# Patient Record
Sex: FEMALE | Race: BLACK OR AFRICAN AMERICAN | Employment: PART TIME | URBAN - METROPOLITAN AREA
[De-identification: names, ages, dates, MRNs, and addresses within clinical notes are randomized per-mention and may not be internally consistent; named-entity substitution may affect disease eponyms.]

---

## 2017-10-02 ENCOUNTER — HOSPITAL ENCOUNTER (OUTPATIENT)
Dept: LAB | Age: 57
Discharge: HOME | End: 2017-10-02

## 2017-10-02 ENCOUNTER — OFFICE VISIT (OUTPATIENT)
Dept: FAMILY MEDICINE CLINIC | Age: 57
End: 2017-10-02

## 2017-10-02 VITALS
TEMPERATURE: 98.3 F | BODY MASS INDEX: 32.17 KG/M2 | HEIGHT: 64 IN | WEIGHT: 188.4 LBS | SYSTOLIC BLOOD PRESSURE: 133 MMHG | HEART RATE: 82 BPM | DIASTOLIC BLOOD PRESSURE: 86 MMHG

## 2017-10-02 DIAGNOSIS — R73.03 PRE-DIABETES: ICD-10-CM

## 2017-10-02 DIAGNOSIS — I10 ESSENTIAL HYPERTENSION: Primary | ICD-10-CM

## 2017-10-02 DIAGNOSIS — H57.9 EYE PROBLEM: ICD-10-CM

## 2017-10-02 DIAGNOSIS — I10 HTN, GOAL BELOW 140/90: ICD-10-CM

## 2017-10-02 DIAGNOSIS — J30.1 CHRONIC SEASONAL ALLERGIC RHINITIS DUE TO POLLEN: ICD-10-CM

## 2017-10-02 DIAGNOSIS — E78.5 HYPERLIPIDEMIA, UNSPECIFIED HYPERLIPIDEMIA TYPE: ICD-10-CM

## 2017-10-02 LAB
ANION GAP SERPL CALC-SCNC: 7 MMOL/L (ref 5–15)
BASOPHILS # BLD: 0 K/UL (ref 0–0.1)
BASOPHILS NFR BLD: 1 % (ref 0–1)
BUN SERPL-MCNC: 14 MG/DL (ref 6–20)
BUN/CREAT SERPL: 13 (ref 12–20)
CALCIUM SERPL-MCNC: 9.3 MG/DL (ref 8.5–10.1)
CHLORIDE SERPL-SCNC: 106 MMOL/L (ref 97–108)
CHOLEST SERPL-MCNC: 160 MG/DL
CO2 SERPL-SCNC: 29 MMOL/L (ref 21–32)
CREAT SERPL-MCNC: 1.09 MG/DL (ref 0.55–1.02)
EOSINOPHIL # BLD: 0.2 K/UL (ref 0–0.4)
EOSINOPHIL NFR BLD: 4 % (ref 0–7)
ERYTHROCYTE [DISTWIDTH] IN BLOOD BY AUTOMATED COUNT: 12.6 % (ref 11.5–14.5)
EST. AVERAGE GLUCOSE BLD GHB EST-MCNC: 111 MG/DL
GLUCOSE SERPL-MCNC: 109 MG/DL (ref 65–100)
HBA1C MFR BLD: 5.5 % (ref 4.2–6.3)
HCT VFR BLD AUTO: 44 % (ref 35–47)
HDLC SERPL-MCNC: 51 MG/DL
HDLC SERPL: 3.1 {RATIO} (ref 0–5)
HGB BLD-MCNC: 14.1 G/DL (ref 11.5–16)
LDLC SERPL CALC-MCNC: 78.6 MG/DL (ref 0–100)
LIPID PROFILE,FLP: ABNORMAL
LYMPHOCYTES # BLD: 1.5 K/UL (ref 0.8–3.5)
LYMPHOCYTES NFR BLD: 22 % (ref 12–49)
MCH RBC QN AUTO: 27.6 PG (ref 26–34)
MCHC RBC AUTO-ENTMCNC: 32 G/DL (ref 30–36.5)
MCV RBC AUTO: 86.3 FL (ref 80–99)
MONOCYTES # BLD: 0.8 K/UL (ref 0–1)
MONOCYTES NFR BLD: 12 % (ref 5–13)
NEUTS SEG # BLD: 4.1 K/UL (ref 1.8–8)
NEUTS SEG NFR BLD: 61 % (ref 32–75)
PLATELET # BLD AUTO: 395 K/UL (ref 150–400)
POTASSIUM SERPL-SCNC: 4.4 MMOL/L (ref 3.5–5.1)
RBC # BLD AUTO: 5.1 M/UL (ref 3.8–5.2)
SODIUM SERPL-SCNC: 142 MMOL/L (ref 136–145)
TRIGL SERPL-MCNC: 152 MG/DL (ref ?–150)
VLDLC SERPL CALC-MCNC: 30.4 MG/DL
WBC # BLD AUTO: 6.6 K/UL (ref 3.6–11)

## 2017-10-02 PROCEDURE — 80048 BASIC METABOLIC PNL TOTAL CA: CPT | Performed by: FAMILY MEDICINE

## 2017-10-02 PROCEDURE — 85025 COMPLETE CBC W/AUTO DIFF WBC: CPT | Performed by: FAMILY MEDICINE

## 2017-10-02 PROCEDURE — 80061 LIPID PANEL: CPT | Performed by: FAMILY MEDICINE

## 2017-10-02 PROCEDURE — 83036 HEMOGLOBIN GLYCOSYLATED A1C: CPT | Performed by: FAMILY MEDICINE

## 2017-10-02 RX ORDER — HYDROCHLOROTHIAZIDE 25 MG/1
12.5 TABLET ORAL DAILY
Qty: 90 TAB | Refills: 1 | Status: SHIPPED | OUTPATIENT
Start: 2017-10-02

## 2017-10-02 RX ORDER — LOVASTATIN 40 MG/1
40 TABLET ORAL
Qty: 90 TAB | Refills: 3 | Status: SHIPPED | OUTPATIENT
Start: 2017-10-02

## 2017-10-02 NOTE — PROGRESS NOTES
Katheryn Ching is a 61 y.o. female    Issues discussed today include:    1) R ear problem:  Ringing, off and on. Has had issues with swimmers ear in that ear before. Denies pain now. No drainage.  + h/o environmental allergies, takes claritin or zyrtec prn and it helps the ringing. No nasal congestion. No fever. 2) HTN:  Chronic. Was being seen at [facility] on [address]. She reports taking HCTZ 12.5mg daily. Still taking, about to run out and would like refill. Checks BP at home, running 110-130/70s. Denies lightheadedness. No side effects. No CP or SOB. 3) HLD:  Chronic. Has been taking mevacor 40mg. Denies myalgias, weakness. + family h/o HLD. Nonsmoker. No h/o MI, CAD. Has been told she was borderline diabetic in the past.    4) Glaucoma risk: Was previously seen by ophthalmology, told she was at risk for glaucoma. Her mother has glaucoma. Was told to f/u in 1 yr, but she lost her insurance. Data reviewed or ordered today:       Other problems include:  Patient Active Problem List   Diagnosis Code    Hypertension I10    GERD (gastroesophageal reflux disease) K21.9    Allergic rhinitis, cause unspecified J30.9    Hypercholesterolemia E78.00       Medications:  Current Outpatient Prescriptions   Medication Sig Dispense Refill    hydroCHLOROthiazide (HYDRODIURIL) 25 mg tablet Take 0.5 Tabs by mouth daily. For blood pressure 90 Tab 1    lovastatin (MEVACOR) 40 mg tablet Take 1 Tab by mouth nightly. For cholesterol 90 Tab 3    ACETAMINOPHEN (TYLENOL 8 HOUR PO) Take  by mouth.  pantoprazole (PROTONIX) 40 mg tablet Take 1 tablet by mouth daily. For acid reflux 90 tablet 3    aluminum & magnesium hydroxide-simethicone (MYLANTA MAXIMUM STRENGTH) 400-400-40 mg/5 mL suspension Take 10 mL by mouth every six (6) hours as needed for Indigestion. 400 mL 3    FERROUS FUMARATE (IRON PO) Take  by mouth.  aspirin 81 mg tablet Take 81 mg by mouth.         multivitamin, stress formula (STRESS TAB) tablet Take 1 Tab by mouth daily.  cholecalciferol, vitamin d3, (VITAMIN D) 1,000 unit tablet Take 1,000 Units by mouth daily.  loratadine (CLARITIN) 10 mg tablet Take 10 mg by mouth daily as needed. Allergies: Allergies   Allergen Reactions    Latex Rash    Strawberry Hives and Rash    Bactrim [Sulfamethoprim Ds] Hives    Other Plant, Animal, Environmental Hives     Wool      Penicillin G Hives       LMP:  No LMP recorded. Patient has had a hysterectomy. Social History     Social History    Marital status: LEGALLY      Spouse name: N/A    Number of children: N/A    Years of education: N/A     Occupational History    Not on file.      Social History Main Topics    Smoking status: Never Smoker    Smokeless tobacco: Never Used    Alcohol use No    Drug use: No    Sexual activity: Yes     Partners: Male     Birth control/ protection: Surgical     Other Topics Concern    Not on file     Social History Narrative       Family History   Problem Relation Age of Onset    Hypertension Mother     Elevated Lipids Mother     Other Mother      arthritis    Glaucoma Mother     Hypertension Father     Elevated Lipids Father     Other Maternal Grandmother      arthritis    Other Daughter      dialysis-after pregnancy, was on it for 18 yrs then passed away       Review of Systems  Constitutional:  negative for fevers, chills  Eyes:    negative for eye redness  ENT:    negative for sore throat, nasal congestion  Respiratory:   negative for cough, dyspnea  Cardiovascular: negative for chest pain  Gastroinestinal: negative for bowel changes  Genitourinary:  negative for frequency  Musculoskeletal: negative for myalgias,weakness  Neurological:   negative for headaches, dizziness  Behavl/Psych:  negative for feelings of tobacco abuse or etoh overuse    Physical Exam  Visit Vitals    /86 (BP 1 Location: Left arm, BP Patient Position: Sitting)    Pulse 82    Temp 98.3 °F (36.8 °C) (Oral)    Ht 5' 3.78\" (1.62 m)    Wt 188 lb 6.4 oz (85.5 kg)    LMP Comment: Partial    BMI 32.56 kg/m2     BP Readings from Last 3 Encounters:   10/02/17 133/86   03/13/16 102/55   08/26/14 (!) 136/92     Constitutional: Appears well,  No acute distress, Vitals noted  Psychiatric:  Affect normal, Alert and Oriented to person/place/time  Eyes:  Conjunctiva clear, no drainage  ENT:  External ears and nose normal, Teeth and gums appear healthy, Mucous membranes moist. TMs grey and non-bulging bilaterally. OP without erythema, edema or exudate. Bilateral nares without active drainage, edema or polyps. Neck:  General inspection normal. Supple. Heart:  Normal HR, Normal S1 and S2,  Regular rhythm. No murmurs, rubs or gallops. No carotid bruit. Lungs:  Clear to auscultation, good respiratory effort, no wheezes, rales or rhonchi  Extremities:  Without edema, good peripheral pulses  Skin:  Warm to palpation, without rashes        Assessment/Plan:      ICD-10-CM ICD-9-CM    1. Essential hypertension I10 401.9 CBC WITH AUTOMATED DIFF      METABOLIC PANEL, BASIC      hydroCHLOROthiazide (HYDRODIURIL) 25 mg tablet   2. Hyperlipidemia, unspecified hyperlipidemia type E78.5 272.4 LIPID PANEL      lovastatin (MEVACOR) 40 mg tablet   3. Chronic seasonal allergic rhinitis due to pollen J30.1 477.0    4. Pre-diabetes R73.03 790.29 HEMOGLOBIN A1C WITH EAG   5. Eye problem H57.9 V41.1 REFERRAL TO OPTOMETRY      REFERRAL TO OPHTHALMOLOGY       New to [facility]   HTN, well controlled   HCTZ and mevacor refilled  Recommend daily 2nd gen antihistamine during season changes  Labs as above  See optometry, if concerns for glaucoma will pursue ophthalmology appt via access now    Follow-up Disposition:  Return in about 6 months (around 4/2/2018).         Justice Bahena MD  [address] [place] [address]

## 2017-10-02 NOTE — MR AVS SNAPSHOT
Visit Information Date & Time Provider Department Dept. Phone Encounter #  
 10/2/2017  2:45 PM Ernesto Youngblood MD [facility] [place] [phone] [id number] Upcoming Health Maintenance Date Due Hepatitis C Screening 1960 FOBT Q 1 YEAR AGE 50-75 2/17/2009 PAP AKA CERVICAL CYTOLOGY 8/1/2016 BREAST CANCER SCRN MAMMOGRAM 9/17/2016 INFLUENZA AGE 9 TO ADULT 10/15/2017* DTaP/Tdap/Td series (3 - Td) 8/4/2022 *Topic was postponed. The date shown is not the original due date. Allergies as of 10/2/2017  Review Complete On: 10/2/2017 By: Ernesto Youngblood MD  
  
 Severity Noted Reaction Type Reactions Latex Medium 08/23/2011   Side Effect Rash Strawberry Medium 08/23/2011   Side Effect Hives, Rash Bactrim [Sulfamethoprim Ds]  08/22/2012    Hives Other Plant, Animal, Environmental  12/30/2013    Hives [place] Penicillin G  07/23/2010    Hives Current Immunizations  Reviewed on 7/23/2010 Name Date TDAP Vaccine 8/4/2012  3:20 PM, 7/23/2010 Not reviewed this visit You Were Diagnosed With   
  
 Codes Comments HTN, goal below 140/90    -  Primary ICD-10-CM: I10 
ICD-9-CM: 401.9 Hyperlipidemia, unspecified hyperlipidemia type     ICD-10-CM: E78.5 ICD-9-CM: 272.4 Pre-diabetes     ICD-10-CM: R73.03 
ICD-9-CM: 790.29 Chronic seasonal allergic rhinitis due to pollen     ICD-10-CM: J30.1 ICD-9-CM: 477.0 Vitals BP Pulse Temp Height(growth percentile) Weight(growth percentile) 133/86 (BP 1 Location: Left arm, BP Patient Position: Sitting) 82 98.3 °F (36.8 °C) (Oral) 5' 3.78\" (1.62 m) 188 lb 6.4 oz (85.5 kg) BMI OB Status Smoking Status 32.56 kg/m2 Hysterectomy Never Smoker Vitals History BMI and BSA Data Body Mass Index Body Surface Area 32.56 kg/m 2 1.96 m 2 Preferred Pharmacy Pharmacy Name Phone [facility] [address], [address] [place] [address] [phone] Your Updated Medication List  
  
   
This list is accurate as of: 10/2/17  3:35 PM.  Always use your most recent med list.  
  
  
  
  
 aluminum & magnesium hydroxide-simethicone 400-400-40 mg/5 mL suspension Commonly known as:  MYLANTA MAXIMUM STRENGTH Take 10 mL by mouth every six (6) hours as needed for Indigestion. aspirin 81 mg tablet Take 81 mg by mouth. CLARITIN 10 mg tablet Generic drug:  loratadine Take 10 mg by mouth daily as needed. hydroCHLOROthiazide 25 mg tablet Commonly known as:  HYDRODIURIL Take 0.5 tablets by mouth daily. For blood pressure IRON PO Take  by mouth.  
  
 lovastatin 40 mg tablet Commonly known as:  MEVACOR Take 1 tablet by mouth nightly. For cholesterol  
  
 multivitamin, stress formula tablet Commonly known as:  STRESS TAB Take 1 Tab by mouth daily. pantoprazole 40 mg tablet Commonly known as:  PROTONIX Take 1 tablet by mouth daily. For acid reflux TYLENOL 8 HOUR PO Take  by mouth. VITAMIN D3 1,000 unit tablet Generic drug:  cholecalciferol Take 1,000 Units by mouth daily. Introducing [facility]! Dear Kamila Ott: Thank you for requesting a [employer] account. Our records indicate that you already have an active DropShip account. You can access your account anytime at [email or web address] [employer] Did you know that you can access your hospital and ER discharge instructions at any time in [employer]? You can also review all of your test results from your hospital stay or ER visit. Additional Information If you have questions, please visit the Frequently Asked Questions section of the [employer] website at [email or web address] Remember, [employer] is NOT to be used for urgent needs. For medical emergencies, dial 911. Now available from your iPhone and Android! Please provide this summary of care documentation to your next provider. If you have any questions after today's visit, please call [phone].

## 2017-10-02 NOTE — PATIENT INSTRUCTIONS
[email or web address] - you will need your prescription from the eye doctor and to measure your pupillary distance (distance between the middle of the darks of your eyes in centimeters) in order to order online           Managing Your Allergies: Care Instructions  Your Care Instructions  Managing your allergies is an important part of staying healthy. Your doctor will help you find out what may be causing the allergies. Common causes of allergy symptoms are house dust and dust mites, animal dander, mold, and pollen. As soon as you know what triggers your symptoms, try to reduce your exposure to your triggers. This can help prevent allergy symptoms, asthma, and other health problems. Ask your doctor about allergy medicine or immunotherapy. These treatments may help reduce or prevent allergy symptoms. Follow-up care is a key part of your treatment and safety. Be sure to make and go to all appointments, and call your doctor if you are having problems. It's also a good idea to know your test results and keep a list of the medicines you take. How can you care for yourself at home? · If you think that dust or dust mites are causing your allergies:  ¨ Wash sheets, pillowcases, and other bedding every week in hot water. ¨ Use airtight, dust-proof covers for pillows, duvets, and mattresses. Avoid plastic covers, because they tend to tear quickly and do not \"breathe. \" Wash according to the instructions. ¨ Remove extra blankets and pillows that you don't need. ¨ Use blankets that are machine-washable. ¨ Don't use home humidifiers. They can help mites live longer. · Use air-conditioning. Change or clean all filters every month. Keep windows closed. Use high-efficiency air filters. Don't use window or attic fans, which draw dust into the air. · If you're allergic to pet dander, keep pets outside or, at the very least, out of your bedroom. Old carpet and cloth-covered furniture can hold a lot of animal dander.  You may need to replace them. · Look for signs of cockroaches. Use cockroach baits to get rid of them. Then clean your home well. · If you're allergic to mold, don't keep indoor plants, because molds can grow in soil. Get rid of furniture, rugs, and drapes that smell musty. Check for mold in the bathroom. · If you're allergic to pollen, stay inside when pollen counts are high. · Don't smoke or let anyone else smoke in your house. Don't use fireplaces or wood-burning stoves. Avoid paint fumes, perfumes, and other strong odors. When should you call for help? Give an epinephrine shot if:  · You think you are having a severe allergic reaction. After giving an epinephrine shot call 911, even if you feel better. Call 911 if:  · You have symptoms of a severe allergic reaction. These may include:  ¨ Sudden raised, red areas (hives) all over your body. ¨ Swelling of the throat, mouth, lips, or tongue. ¨ Trouble breathing. ¨ Passing out (losing consciousness). Or you may feel very lightheaded or suddenly feel weak, confused, or restless. · You have been given an epinephrine shot, even if you feel better. Call your doctor now or seek immediate medical care if:  · You have symptoms of an allergic reaction, such as:  ¨ A rash or hives (raised, red areas on the skin). ¨ Itching. ¨ Swelling. ¨ Belly pain, nausea, or vomiting. Watch closely for changes in your health, and be sure to contact your doctor if:  · Your allergies get worse. · You need help controlling your allergies. · You have questions about allergy testing. · You do not get better as expected. Where can you learn more? Go to http://devora-nereida.info/. Enter L249 in the search box to learn more about \"Managing Your Allergies: Care Instructions. \"  Current as of: April 3, 2017  Content Version: 11.3  © 4860-1296 [employer].  Care instructions adapted under license by [employer] (which disclaims liability or warranty for this information). If you have questions about a medical condition or this instruction, always ask your healthcare professional. [place] [address] any warranty or liability for your use of this information.

## 2017-10-02 NOTE — PROGRESS NOTES
AVS given and reviewed. Prescription and pharmacy location discussed with patient. Instructed patient that Dr. Marichuy Meredith had order the HCTZ 25mg, but that she will have to cut tablet in half (1/2) equaling HCTZ 12.5mg. Patient verbalizes understanding and states that she used to do that in the past.  Instructed patient that she will need to meet with [facility]  to start access now process, before she can meet with an ophthalmologist. Nelia Jaimes not available at site, instructed patient that she should receive a call within 1 week and if not to please call our office to let us know. Vision resources sheet given to patient, medical record completed and will get to [facility] to be faxed requesting medical records from [address] phone # [phone], fax # [phone] Patient verbalized understanding and does not have any questions or concerns at this time.  Matt Pinto RN

## 2017-10-02 NOTE — PROGRESS NOTES
Coordination of Care  1. Have you been to the ER, urgent care clinic since your last visit? Hospitalized since your last visit? No    2. Have you seen or consulted any other health care providers outside of the [address] [place] [address] since your last visit? Include any pap smears or colon screening. No    Medications  Medication Reconciliation Performed: no  Patient does need refills     Learning Assessment Complete?  yes\

## 2017-10-04 ENCOUNTER — TELEPHONE (OUTPATIENT)
Dept: FAMILY MEDICINE CLINIC | Age: 57
End: 2017-10-04

## 2017-10-04 NOTE — TELEPHONE ENCOUNTER
Spoke to [employer] and scheduled AN screening appointment on 11/6/17 @ 9:00 am.    Hodan Hobbs

## 2017-11-01 ENCOUNTER — OFFICE VISIT (OUTPATIENT)
Dept: FAMILY MEDICINE CLINIC | Age: 57
End: 2017-11-01

## 2017-11-01 DIAGNOSIS — Z71.89 COUNSELING AND COORDINATION OF CARE: Primary | ICD-10-CM

## 2017-11-01 NOTE — PROGRESS NOTES
Met with Loree Oneal and explained the income verification that I need to do her [employer] application. She brought me a letter from her job but it was not on letterhead or notarized. The patient gave me permission to call her job to explain what kind of verification I need. She have an appointment with me on Monday 11/7/17 so I told her to keep that appointment and bring in the new letter from her job.   Xiao Cueto

## 2017-11-06 ENCOUNTER — OFFICE VISIT (OUTPATIENT)
Dept: FAMILY MEDICINE CLINIC | Age: 57
End: 2017-11-06

## 2017-11-06 DIAGNOSIS — Z71.89 COUNSELING AND COORDINATION OF CARE: Primary | ICD-10-CM

## 2017-12-15 ENCOUNTER — HOSPITAL ENCOUNTER (EMERGENCY)
Age: 57
Discharge: HOME | End: 2017-12-15
Attending: EMERGENCY MEDICINE
Payer: SELF-PAY

## 2017-12-15 VITALS
HEIGHT: 65 IN | WEIGHT: 184.3 LBS | OXYGEN SATURATION: 100 % | BODY MASS INDEX: 30.71 KG/M2 | TEMPERATURE: 97.7 F | DIASTOLIC BLOOD PRESSURE: 97 MMHG | HEART RATE: 63 BPM | SYSTOLIC BLOOD PRESSURE: 148 MMHG | RESPIRATION RATE: 16 BRPM

## 2017-12-15 DIAGNOSIS — N76.0 BV (BACTERIAL VAGINOSIS): ICD-10-CM

## 2017-12-15 DIAGNOSIS — R82.71 BACTERIA IN URINE: Primary | ICD-10-CM

## 2017-12-15 DIAGNOSIS — B96.89 BV (BACTERIAL VAGINOSIS): ICD-10-CM

## 2017-12-15 LAB
APPEARANCE UR: CLEAR
BACTERIA URNS QL MICRO: ABNORMAL /HPF
BILIRUB UR QL: NEGATIVE
CLUE CELLS VAG QL WET PREP: NORMAL
COLOR UR: ABNORMAL
EPITH CASTS URNS QL MICRO: ABNORMAL /LPF
GLUCOSE UR STRIP.AUTO-MCNC: NEGATIVE MG/DL
HGB UR QL STRIP: NEGATIVE
HYALINE CASTS URNS QL MICRO: ABNORMAL /LPF (ref 0–5)
KETONES UR QL STRIP.AUTO: NEGATIVE MG/DL
KOH PREP SPEC: NORMAL
LEUKOCYTE ESTERASE UR QL STRIP.AUTO: NEGATIVE
NITRITE UR QL STRIP.AUTO: NEGATIVE
PH UR STRIP: 5 [PH] (ref 5–8)
PROT UR STRIP-MCNC: NEGATIVE MG/DL
RBC #/AREA URNS HPF: ABNORMAL /HPF (ref 0–5)
SERVICE CMNT-IMP: NORMAL
SP GR UR REFRACTOMETRY: >1.03 (ref 1–1.03)
T VAGINALIS VAG QL WET PREP: NORMAL
UA: UC IF INDICATED,UAUC: ABNORMAL
UROBILINOGEN UR QL STRIP.AUTO: 0.2 EU/DL (ref 0.2–1)
WBC URNS QL MICRO: ABNORMAL /HPF (ref 0–4)

## 2017-12-15 PROCEDURE — 87210 SMEAR WET MOUNT SALINE/INK: CPT | Performed by: PHYSICIAN ASSISTANT

## 2017-12-15 PROCEDURE — 81001 URINALYSIS AUTO W/SCOPE: CPT | Performed by: EMERGENCY MEDICINE

## 2017-12-15 PROCEDURE — 87086 URINE CULTURE/COLONY COUNT: CPT | Performed by: EMERGENCY MEDICINE

## 2017-12-15 PROCEDURE — 87491 CHLMYD TRACH DNA AMP PROBE: CPT | Performed by: PHYSICIAN ASSISTANT

## 2017-12-15 PROCEDURE — 99284 EMERGENCY DEPT VISIT MOD MDM: CPT

## 2017-12-15 PROCEDURE — 74011250637 HC RX REV CODE- 250/637: Performed by: PHYSICIAN ASSISTANT

## 2017-12-15 RX ORDER — METRONIDAZOLE 500 MG/1
500 TABLET ORAL 2 TIMES DAILY
Qty: 14 TAB | Refills: 0 | Status: SHIPPED | OUTPATIENT
Start: 2017-12-15 | End: 2017-12-22

## 2017-12-15 RX ORDER — FLUCONAZOLE 150 MG/1
150 TABLET ORAL
Qty: 1 TAB | Refills: 0 | Status: SHIPPED | OUTPATIENT
Start: 2017-12-15 | End: 2017-12-15

## 2017-12-15 RX ORDER — PHENAZOPYRIDINE HYDROCHLORIDE 100 MG/1
200 TABLET, FILM COATED ORAL
Status: COMPLETED | OUTPATIENT
Start: 2017-12-15 | End: 2017-12-15

## 2017-12-15 RX ADMIN — PHENAZOPYRIDINE HYDROCHLORIDE 200 MG: 100 TABLET ORAL at 17:55

## 2017-12-15 NOTE — ED TRIAGE NOTES
Pt arrived ambulatory from triage to room 3 with cc of urinary urgency/pain. Pt states she has been urinating more frequently than normal and over the last few days. Pt also reports lower abdominal pain and thin white vaginal discharge. Pt denies any burning with urination, fever, chills, N/V/D. Pt in no acute distress. VSS.

## 2017-12-15 NOTE — ED PROVIDER NOTES
EMERGENCY DEPARTMENT HISTORY AND PHYSICAL EXAM      Date: 12/15/2017  Patient Name: Anam Branham    History of Presenting Illness     Chief Complaint   Patient presents with    Urinary Pain     x 2-3 days with frequency       History Provided By: Patient    HPI: Anam Branham, 61 y.o. female with PMHx significant for HTN, GERD, HCL and arthritis, presents ambulatory to the ED with cc of persistent, aching lower abdominal pain x one week. Pt notes associated increased urinary frequency, generalized body aches and thin, white vaginal discharge. She notes intermittent \"pinching\" abdominal pain with movement. Pt reports treating her sxs with fluids including water and cranberry juice, Advil, and hot baths with minimal relief. Of note, pt reports moderate amount of physical activity daily, in since she does [employer] work for her occupation. Pt specifically denies any fever, chills, cough, congestion, shortness of breath, chest pain, nausea, vomiting, diarrhea, hematuria ordysuria. PMHx: Significant for HTN, GERD, HCL and arthritis  PSHx: Significant for  Section, hysterectomy, hernia repair, cholecystectomy  Social Hx: -tobacco, -EtOH, -Illicit Drugs     PCP: Carmita Condon MD    There are no other complaints, changes, or physical findings at this time. Current Outpatient Prescriptions   Medication Sig Dispense Refill    metroNIDAZOLE (FLAGYL) 500 mg tablet Take 1 Tab by mouth two (2) times a day for 7 days. 14 Tab 0    hydroCHLOROthiazide (HYDRODIURIL) 25 mg tablet Take 0.5 Tabs by mouth daily. For blood pressure 90 Tab 1    lovastatin (MEVACOR) 40 mg tablet Take 1 Tab by mouth nightly. For cholesterol 90 Tab 3    ACETAMINOPHEN (TYLENOL 8 HOUR PO) Take  by mouth.  pantoprazole (PROTONIX) 40 mg tablet Take 1 tablet by mouth daily.  For acid reflux 90 tablet 3    aluminum & magnesium hydroxide-simethicone (MYLANTA MAXIMUM STRENGTH) 400-400-40 mg/5 mL suspension Take 10 mL by mouth every six (6) hours as needed for Indigestion. 400 mL 3    FERROUS FUMARATE (IRON PO) Take  by mouth.  aspirin 81 mg tablet Take 81 mg by mouth.  multivitamin, stress formula (STRESS TAB) tablet Take 1 Tab by mouth daily.  cholecalciferol, vitamin d3, (VITAMIN D) 1,000 unit tablet Take 1,000 Units by mouth daily.  loratadine (CLARITIN) 10 mg tablet Take 10 mg by mouth daily as needed. Past History     Past Medical History:  Past Medical History:   Diagnosis Date    Allergic rhinitis, cause unspecified     GERD (gastroesophageal reflux disease)     Hypercholesterolemia     Hypertension        Past Surgical History:  Past Surgical History:   Procedure Laterality Date    ENDOSCOPY, COLON, DIAGNOSTIC  09    8 yr    HX ADENOIDECTOMY      HX  SECTION  78; 80    HX CHOLECYSTECTOMY  4/10    with umbilical hernia    HX GYN      removed fibrous tissue    HX HYSTERECTOMY  ?03    cervix left    HX TONSILLECTOMY      OR EGD TRANSORAL BIOPSY SINGLE/MULTIPLE  2012         REMOVAL GALLBLADDER  2009       Family History:  Family History   Problem Relation Age of Onset    Hypertension Mother     Elevated Lipids Mother     Other Mother      arthritis    Glaucoma Mother     Hypertension Father     Elevated Lipids Father     Other Maternal Grandmother      arthritis    Other Daughter      dialysis-after pregnancy, was on it for 18 yrs then passed away       Social History:  Social History   Substance Use Topics    Smoking status: Never Smoker    Smokeless tobacco: Never Used    Alcohol use No       Allergies: Allergies   Allergen Reactions    Latex Rash    Strawberry Hives and Rash    Bactrim [Sulfamethoprim Ds] Hives    Other Plant, Animal, Environmental Hives     Wool      Penicillin G Hives         Review of Systems   Review of Systems   Constitutional: Negative. Negative for activity change, appetite change, chills, diaphoresis, fever and unexpected weight change. HENT: Negative for congestion, hearing loss, rhinorrhea, sinus pressure, sneezing, sore throat and trouble swallowing. Eyes: Negative for pain, redness, itching and visual disturbance. Respiratory: Negative for cough, shortness of breath and wheezing. Cardiovascular: Negative for chest pain, palpitations and leg swelling. Gastrointestinal: Positive for abdominal pain. Negative for constipation, diarrhea, nausea and vomiting. Genitourinary: Positive for frequency and vaginal discharge. Negative for dysuria, hematuria and vaginal bleeding. Musculoskeletal: Positive for myalgias (generalized). Negative for arthralgias and gait problem. Skin: Negative for color change, pallor, rash and wound. Neurological: Negative for tremors, weakness, light-headedness, numbness and headaches. All other systems reviewed and are negative. Physical Exam   Physical Exam   Constitutional: She is oriented to person, place, and time. Vital signs are normal. She appears well-developed and well-nourished. No distress. 61 y.o.  female in NAD  Communicates appropriately and in full sentences  Normal vital signs   HENT:   Head: Normocephalic and atraumatic. Eyes: Conjunctivae are normal. Pupils are equal, round, and reactive to light. Right eye exhibits no discharge. Left eye exhibits no discharge. Neck: Normal range of motion. Neck supple. No nuchal rigidity   Cardiovascular: Normal rate, regular rhythm and intact distal pulses. Pulmonary/Chest: Effort normal and breath sounds normal. No respiratory distress. She has no wheezes. Abdominal: Soft. Bowel sounds are normal. She exhibits no distension. There is no tenderness. There is no CVA tenderness. Genitourinary:   Genitourinary Comments: Pelvic Exam Findings: Moderate amount of thick white discharge malodorous, no CMT, no uterine tenderness, cervix closed. Musculoskeletal: Normal range of motion.  She exhibits no edema, tenderness or deformity. No neurologic, motor, vascular, or compartment embarrassment observed on exam. No focal neurologic deficits. Neurological: She is alert and oriented to person, place, and time. Coordination normal.   Skin: Skin is warm and dry. No rash noted. She is not diaphoretic. No erythema. No pallor. Psychiatric: She has a normal mood and affect. Nursing note and vitals reviewed. Diagnostic Study Results     Labs -     Recent Results (from the past 12 hour(s))   URINALYSIS W/ REFLEX CULTURE    Collection Time: 12/15/17  4:57 PM   Result Value Ref Range    Color YELLOW/STRAW      Appearance CLEAR CLEAR      Specific gravity >1.030 (H) 1.003 - 1.030    pH (UA) 5.0 5.0 - 8.0      Protein NEGATIVE  NEG mg/dL    Glucose NEGATIVE  NEG mg/dL    Ketone NEGATIVE  NEG mg/dL    Bilirubin NEGATIVE  NEG      Blood NEGATIVE  NEG      Urobilinogen 0.2 0.2 - 1.0 EU/dL    Nitrites NEGATIVE  NEG      Leukocyte Esterase NEGATIVE  NEG      UA:UC IF INDICATED URINE CULTURE ORDERED (A) CNI      WBC 0-4 0 - 4 /hpf    RBC 0-5 0 - 5 /hpf    Epithelial cells FEW FEW /lpf    Bacteria 1+ (A) NEG /hpf    Hyaline cast 2-5 0 - 5 /lpf   VANIA, OTHER SOURCES    Collection Time: 12/15/17  5:32 PM   Result Value Ref Range    Special Requests: NO SPECIAL REQUESTS      KOH NO YEAST SEEN     WET PREP    Collection Time: 12/15/17  5:32 PM   Result Value Ref Range    Clue cells CLUE CELLS PRESENT      Wet prep NO TRICHOMONAS SEEN         Medical Decision Making   I am the first provider for this patient. I reviewed the vital signs, available nursing notes, past medical history, past surgical history, family history and social history. Vital Signs-Reviewed the patient's vital signs.   Patient Vitals for the past 12 hrs:   Temp Pulse Resp BP SpO2   12/15/17 1617 97.7 °F (36.5 °C) 63 16 (!) 148/97 100 %       Pulse Oximetry Analysis - 100% on RA    Cardiac Monitor:   Rate: 63 bpm  Rhythm: Normal Sinus Rhythm      Records Reviewed: Nursing Notes and Old Medical Records      ED Course:   Initial assessment performed. The patients presenting problems have been discussed, and they are in agreement with the care plan formulated and outlined with them. I have encouraged them to ask questions as they arise throughout their visit. I am the first provider for this patient. I reviewed our electronic medical record system for any past medical records that were available that may contribute to the patients current condition, the nursing notes and vital signs from today's visit     Nursing notes will be reviewed as they become available in realtime while the pt is in the ED. Provider Notes/Medical Decision Making:  DDx: UTI, Bacterial vaginosis, BV, yeast, Chlamydia, Gonorrhea, low suspicion kidney stone    60 yo presents with urinary frequency and vaginal discharge. Will evaluate with UA and pelvic exam. Declines STI empiric treatment. +BV. Negative for nitrites or leuks in urine. Urine culture sent. Urged close GYN follow-up and no alcohol intake with Flagyl. Pt expresses understanding. Provided customary ED return precautions. Progress Note:  5:22 PM   The patients presenting problems have been discussed, and they are in agreement with the care plan formulated and outlined with them. I have encouraged them to ask questions as they arise throughout their visit. Will continue to monitor. Discussed with patient the medical necessity of performing a pelvic exam to ensure that an infectios etiology is ruled out as a cause of her pelvic pain. Pt consents to have the exam performed. Explained that the KOH and wet prep swabs while result while in [facility] ED, but the GC/CT will take 48-72 hours to result, which would prompt a provider to call her if the results are positive. Offered her empiric treatment while in [facility] ED and patient declines.  Spoke of importance for receiving pap smears regularly with a decided timeline prescribed by her gynecologist. Tamiko Simon expresses understanding. Procedure Note - Pelvic Exam:    5:30 PM  Performed by: Paige Perez PA-C  Chaperoned by: Nader Villagran RN  Pelvic exam was performed using bimanual and speculum. Further findings noted in physical exam.   The procedure took 1-15 minutes, and pt tolerated well. DISCHARGE NOTE:  6:27 PM  Gloria Montoya's  results have been reviewed with her. She has been counseled regarding her diagnosis. She verbally conveys understanding and agreement of the signs, symptoms, diagnosis, treatment and prognosis and additionally agrees to follow up as recommended with Dr. Eyal Condon MD in 24 - 48 hours. She also agrees with the care-plan and conveys that all of her questions have been answered. I have also put together some discharge instructions for her that include: 1) educational information regarding their diagnosis, 2) how to care for their diagnosis at home, as well a 3) list of reasons why they would want to return to the ED prior to their follow-up appointment, should their condition change. She and/or family's questions have been answered. I have encouraged them to see the official results in [facility]\" or to retrieve the specifics of their results from medical records. Plan:  1. Return precautions  2. Medications as prescribed  3. Follow-ups as discussed  Current Discharge Medication List      START taking these medications    Details   metroNIDAZOLE (FLAGYL) 500 mg tablet Take 1 Tab by mouth two (2) times a day for 7 days.   Qty: 14 Tab, Refills: 0           Follow-up Information     Follow up With Details Comments Contact Info    Carmita Condon MD Schedule an appointment as soon as possible for a visit in 2 days As needed, If symptoms worsen, Possible further evaluation and treatment Patient can only remember the practice name and not the physician      [facility] EMERGENCY DEPT Go to If symptoms worsen, As needed [address]  [address]   [phone] [facility] [address] Schedule an appointment as soon as possible for a visit in 2 days As needed, If symptoms worsen, Possible further evaluation and treatment [id number] [facility] [id number]          Return to the closest emergency room or follow up sooner for any deterioration. Diagnosis     Clinical Impression:   1. Bacteria in urine    2. BV (bacterial vaginosis)        Attestations: This note is prepared by Sadia Marcial, acting as Scribe for [employer]      The scribe's documentation has been prepared under my direction and personally reviewed by me in its entirety. I confirm that the note above accurately reflects all work, treatment, procedures, and medical decision making performed by me. [employer]          This note will not be viewable in [address]

## 2017-12-15 NOTE — DISCHARGE INSTRUCTIONS
Bacterial Vaginosis: Care Instructions  Your Care Instructions    Bacterial vaginosis is a type of vaginal infection. It is caused by excess growth of certain bacteria that are normally found in the vagina. Symptoms can include itching, swelling, pain when you urinate or have sex, and a gray or yellow discharge with a \"fishy\" odor. It is not considered an infection that is spread through sexual contact. Although symptoms can be annoying and uncomfortable, bacterial vaginosis does not usually cause other health problems. However, if you have it while you are pregnant, it can cause complications. While the infection may go away on its own, most doctors use antibiotics to treat it. You may have been prescribed pills or vaginal cream. With treatment, bacterial vaginosis usually clears up in 5 to 7 days. Follow-up care is a key part of your treatment and safety. Be sure to make and go to all appointments, and call your doctor if you are having problems. It's also a good idea to know your test results and keep a list of the medicines you take. How can you care for yourself at home? · Take your antibiotics as directed. Do not stop taking them just because you feel better. You need to take the full course of antibiotics. · Do not eat or drink anything that contains alcohol if you are taking metronidazole (Flagyl). · Keep using your medicine if you start your period. Use pads instead of tampons while using a vaginal cream or suppository. Tampons can absorb the medicine. · Wear loose cotton clothing. Do not wear nylon and other materials that hold body heat and moisture close to the skin. · Do not scratch. Relieve itching with a cold pack or a cool bath. · Do not wash your vaginal area more than once a day. Use plain water or a mild, unscented soap. Do not douche. When should you call for help?   Watch closely for changes in your health, and be sure to contact your doctor if:  ? · You have unexpected vaginal bleeding. ? · You have a fever. ? · You have new or increased pain in your vagina or pelvis. ? · You are not getting better after 1 week. ? · Your symptoms return after you finish the course of your medicine. Where can you learn more? Go to http://devora-nereida.info/. Monica Godwin in the search box to learn more about \"Bacterial Vaginosis: Care Instructions. \"  Current as of: October 13, 2016  Content Version: 11.4  © 6368-9262 [employer]. Care instructions adapted under license by [employer] (which disclaims liability or warranty for this information). If you have questions about a medical condition or this instruction, always ask your healthcare professional. [address] any warranty or liability for your use of this information. Vaginitis: Care Instructions  Your Care Instructions    Vaginitis is soreness or infection of the vagina. This common problem can cause itching and burning. And it can cause a change in vaginal discharge. Sometimes it can cause pain during sex. Vaginitis may be caused by bacteria, yeast, or other germs. Some infections that cause it are caught from a sexual partner. Bath products, spermicides, and douches can irritate the vagina too. Some women have this problem during and after menopause. A drop in estrogen levels during this time can cause dryness, soreness, and pain during sex. Your doctor can give you medicine to treat an infection. And home care may help you feel better. For certain types of infections, your sex partner must be treated too. Follow-up care is a key part of your treatment and safety. Be sure to make and go to all appointments, and call your doctor if you are having problems. It's also a good idea to know your test results and keep a list of the medicines you take. How can you care for yourself at home? · If your doctor prescribed antibiotics, take them as directed.  Do not stop taking them just because you feel better. You need to take the full course of antibiotics. · Take your medicines exactly as prescribed. Call your doctor if you think you are having a problem with your medicine. · Do not eat or drink anything that has alcohol if you are taking metronidazole (Flagyl). · If you have a yeast infection, use over-the-counter products as your doctor tells you to. Or take medicine your doctor prescribes exactly as directed. · Wash your vaginal area daily with water. You also can use a mild, unscented soap if you want. · Do not use scented bath products. And do not use vaginal sprays or douches. · Put a washcloth soaked in cool water on the area to relieve itching. Or you can take cool baths. · If you have dryness because of menopause, use estrogen cream or pills that your doctor prescribes. · Ask your doctor about when it is okay to have sex. · Use a personal lubricant before sex if you have dryness. Examples are Astroglide, K-Y Jelly, and Wet Lubricant Gel. · Ask your doctor if your sex partner also needs treatment. When should you call for help? Call your doctor now or seek immediate medical care if:  ? · You have a fever and pelvic pain. ? Watch closely for changes in your health, and be sure to contact your doctor if:  ? · You have bleeding other than your period. ? · You do not get better as expected. Where can you learn more? Go to http://devora-nereida.info/. Enter E261 in the search box to learn more about \"Vaginitis: Care Instructions. \"  Current as of: October 13, 2016  Content Version: 11.4  © 7489-4954 [employer]. Care instructions adapted under license by [employer] (which disclaims liability or warranty for this information).  If you have questions about a medical condition or this instruction, always ask your healthcare professional. [address] any warranty or liability for your use of this information.

## 2017-12-15 NOTE — LETTER
[address] 
[facility] EMERGENCY DEPT 
[address] [place] [address] [address] [id number] [phone] Work/School Note Date: 12/15/2017 To Whom It May concern: 
 
Luci Briggs was seen and treated today in the emergency room by the following provider(s): 
Attending Provider: Maury Gordon DO Physician Assistant: Suhas Mace. Kian Rogers. Luci Briggs may return to work on 12/17/2017. Sincerely, 
 
 
 
 
Suhas Mace.  [facility]

## 2017-12-15 NOTE — ED NOTES
PA has reviewed discharge instructions with the patient. The patient verbalized understanding. Pt ambulatory home with granddaughter.

## 2017-12-17 LAB
BACTERIA SPEC CULT: NORMAL
CC UR VC: NORMAL
SERVICE CMNT-IMP: NORMAL

## 2017-12-18 LAB
C TRACH DNA SPEC QL NAA+PROBE: NEGATIVE
N GONORRHOEA DNA SPEC QL NAA+PROBE: NEGATIVE
SAMPLE TYPE: NORMAL
SERVICE CMNT-IMP: NORMAL
SPECIMEN SOURCE: NORMAL

## 2018-01-08 ENCOUNTER — HOSPITAL ENCOUNTER (OUTPATIENT)
Dept: LAB | Age: 58
Discharge: HOME | End: 2018-01-08

## 2018-01-08 ENCOUNTER — OFFICE VISIT (OUTPATIENT)
Dept: FAMILY MEDICINE CLINIC | Age: 58
End: 2018-01-08

## 2018-01-08 VITALS
WEIGHT: 182 LBS | TEMPERATURE: 97.6 F | SYSTOLIC BLOOD PRESSURE: 140 MMHG | BODY MASS INDEX: 30.76 KG/M2 | HEART RATE: 60 BPM | DIASTOLIC BLOOD PRESSURE: 85 MMHG

## 2018-01-08 DIAGNOSIS — R68.2 DRY MOUTH: ICD-10-CM

## 2018-01-08 DIAGNOSIS — R79.89 ELEVATED SERUM CREATININE: ICD-10-CM

## 2018-01-08 DIAGNOSIS — K13.0 LIP LESION: Primary | ICD-10-CM

## 2018-01-08 LAB
ANION GAP SERPL CALC-SCNC: 5 MMOL/L (ref 5–15)
BUN SERPL-MCNC: 12 MG/DL (ref 6–20)
BUN/CREAT SERPL: 13 (ref 12–20)
CALCIUM SERPL-MCNC: 9.2 MG/DL (ref 8.5–10.1)
CHLORIDE SERPL-SCNC: 107 MMOL/L (ref 97–108)
CO2 SERPL-SCNC: 29 MMOL/L (ref 21–32)
CREAT SERPL-MCNC: 0.92 MG/DL (ref 0.55–1.02)
GLUCOSE SERPL-MCNC: 93 MG/DL (ref 65–100)
POTASSIUM SERPL-SCNC: 4.6 MMOL/L (ref 3.5–5.1)
SODIUM SERPL-SCNC: 141 MMOL/L (ref 136–145)

## 2018-01-08 PROCEDURE — 80048 BASIC METABOLIC PNL TOTAL CA: CPT | Performed by: FAMILY MEDICINE

## 2018-01-08 RX ORDER — MUPIROCIN 20 MG/G
OINTMENT TOPICAL 3 TIMES DAILY
Qty: 22 G | Refills: 0 | Status: SHIPPED | OUTPATIENT
Start: 2018-01-08

## 2018-01-08 NOTE — PROGRESS NOTES
Coordination of Care  1. Have you been to the ER, urgent care clinic since your last visit? Hospitalized since your last visit? 12/15/17 [address] [place] [address]    2. Have you seen or consulted any other health care providers outside of the [address] [place] [address] since your last visit? Include any pap smears or colon screening. No    Medications  Does the patient need refills? NO    Learning Assessment Complete?  yes

## 2018-01-08 NOTE — MR AVS SNAPSHOT
Visit Information Date & Time Provider Department Dept. Phone Encounter #  
 1/8/2018 10:00 AM MD RAYMOND Jacinto Macario Hi [phone] [id number] Follow-up Instructions Return in about 6 months (around 7/8/2018) for HTN follow up. Upcoming Health Maintenance Date Due Hepatitis C Screening 1960 FOBT Q 1 YEAR AGE 50-75 2/17/2009 PAP AKA CERVICAL CYTOLOGY 8/1/2016 BREAST CANCER SCRN MAMMOGRAM 9/17/2016 Influenza Age 5 to Adult 8/1/2017 DTaP/Tdap/Td series (3 - Td) 8/4/2022 Allergies as of 1/8/2018  Review Complete On: 1/8/2018 By: Crista Spaulding Severity Noted Reaction Type Reactions Latex Medium 08/23/2011   Side Effect Rash Strawberry Medium 08/23/2011   Side Effect Hives, Rash Bactrim [Sulfamethoprim Ds]  08/22/2012    Hives Other Plant, Animal, Environmental  12/30/2013    Hives [place] Penicillin G  07/23/2010    Hives Current Immunizations  Reviewed on 7/23/2010 Name Date TDAP Vaccine 8/4/2012  3:20 PM, 7/23/2010 Not reviewed this visit You Were Diagnosed With   
  
 Codes Comments Elevated serum creatinine    -  Primary ICD-10-CM: R79.89 ICD-9-CM: 790.99 Dry mouth     ICD-10-CM: R68.2 ICD-9-CM: 527.7 Lip lesion     ICD-10-CM: K13.0 ICD-9-CM: 528.5 Vitals BP Pulse Temp Weight(growth percentile) BMI OB Status 140/85 (BP 1 Location: Right arm, BP Patient Position: Sitting) 60 97.6 °F (36.4 °C) (Oral) 182 lb (82.6 kg) 30.76 kg/m2 Hysterectomy Smoking Status Never Smoker Vitals History BMI and BSA Data Body Mass Index Body Surface Area 30.76 kg/m 2 1.94 m 2 Preferred Pharmacy Pharmacy Name Phone [facility] [address] [phone] Your Updated Medication List  
  
   
This list is accurate as of: 1/8/18 11:50 AM.  Always use your most recent med list.  
  
  
  
  
 aluminum & magnesium hydroxide-simethicone 400-400-40 mg/5 mL suspension Commonly known as:  MYLANTA MAXIMUM STRENGTH Take 10 mL by mouth every six (6) hours as needed for Indigestion. aspirin 81 mg tablet Take 81 mg by mouth. CLARITIN 10 mg tablet Generic drug:  loratadine Take 10 mg by mouth daily as needed. hydroCHLOROthiazide 25 mg tablet Commonly known as:  HYDRODIURIL Take 0.5 Tabs by mouth daily. For blood pressure IRON PO Take  by mouth.  
  
 lovastatin 40 mg tablet Commonly known as:  MEVACOR Take 1 Tab by mouth nightly. For cholesterol  
  
 multivitamin, stress formula tablet Commonly known as:  STRESS TAB Take 1 Tab by mouth daily. mupirocin 2 % ointment Commonly known as:  [employer] Apply  to affected area three (3) times daily. TYLENOL 8 HOUR PO Take  by mouth. VITAMIN D3 1,000 unit tablet Generic drug:  cholecalciferol Take 1,000 Units by mouth daily. Prescriptions Sent to Pharmacy Refills  
 mupirocin (BACTROBAN) 2 % ointment 0 Sig: Apply  to affected area three (3) times daily. Class: Normal  
 Pharmacy: [facility] DR MARLENE AKHTAR [address] [place] [address], [address] [place] [address] Ph #: [phone] Route: Topical  
  
We Performed the Following METABOLIC PANEL, BASIC [77446 CPT(R)] Follow-up Instructions Return in about 6 months (around 7/8/2018) for HTN follow up. Patient Instructions Wong: Care Instructions Your Care Instructions Burns-even minor ones-can be very painful. A minor burn may heal within several days, while a more serious burn may take weeks or even months to heal completely. You may notice that the burned area feels tight and hard while it is healing. It is important to continue to move the area as the burn heals to prevent loss of motion or loss of function in the area. When your skin is damaged by a burn, you have a greater risk of infection. Keep the wound clean and change the bandages regularly to prevent infection and help the burn heal. 
Burns can leave permanent scars. Taking good care of the burn as it heals may help prevent bad scars. The doctor has checked you carefully, but problems can develop later. If you notice any problems or new symptoms, get medical treatment right away. Follow-up care is a key part of your treatment and safety. Be sure to make and go to all appointments, and call your doctor if you are having problems. It's also a good idea to know your test results and keep a list of the medicines you take. How can you care for yourself at home? · If your doctor told you how to care for your burn, follow your doctor's instructions. If you did not get instructions, follow this general advice: ¨ Wash the burn with clean water 2 times a day. Don't use hydrogen peroxide or alcohol, which can slow healing. ¨ Gently pat the burn dry after you wash it. ¨ You may cover the burn with a thin layer of petroleum jelly, such as Vaseline, and a nonstick bandage. ¨ Apply more petroleum jelly and replace the bandage as needed. · Protect your burn while it is healing. Cover your burn if you are going out in the cold or the sun. ¨ Wear long sleeves if the burn is on your hands or arms. ¨ Wear a hat if the burn is on your face. ¨ Wear socks and shoes if the burn is on your feet. · Do not break blisters open. This increases the chance of infection. If a blister breaks open by itself, blot up the liquid, and leave the skin that covered the blister. This helps protect the new skin. · If your doctor prescribed antibiotics, take them as directed. Do not stop taking them just because you feel better. You need to take the full course of antibiotics. For pain and itching · Take pain medicines exactly as directed. ¨ If the doctor gave you a prescription medicine for pain, take it as prescribed. ¨ If you are not taking a prescription pain medicine, ask your doctor if you can take an over-the-counter medicine. · If the burn itches, try not to scratch it. Try an over-the-counter antihistamine such as diphenhydramine (Benadryl) or loratadine (Claritin). Read and follow all instructions on the label. When should you call for help? Call your doctor now or seek immediate medical care if: 
? · Your pain gets worse. ? · You have symptoms of infection, such as: 
¨ Increased pain, swelling, warmth, or redness near the burn. ¨ Red streaks leading from the burn. ¨ Pus draining from the burn. ¨ A fever. ? Watch closely for changes in your health, and be sure to contact your doctor if: 
? · You do not get better as expected. Where can you learn more? Go to http://devora-nereida.info/. Enter F448 in the search box to learn more about \"Burns: Care Instructions. \" Current as of: March 20, 2017 Content Version: 11.4 © 4347-0121 [employer]. Care instructions adapted under license by [employer] (which disclaims liability or warranty for this information). If you have questions about a medical condition or this instruction, always ask your healthcare professional. [address] any warranty or liability for your use of this information. Introducing [facility]! Dear Ahmet Elmore: Thank you for requesting a Calxeda account. Our records indicate that you already have an active Calxeda account. You can access your account anytime at [email or web address] "[email or web address][email or web address] Did you know that you can access your hospital and ER discharge instructions at any time in Calxeda? You can also review all of your test results from your hospital stay or ER visit. Additional Information If you have questions, please visit the Frequently Asked Questions section of the Graph Story website at [email or web address] Remember, Graph Story is NOT to be used for urgent needs. For medical emergencies, dial 911. Now available from your iPhone and Android! Please provide this summary of care documentation to your next provider. Your primary care clinician is listed as Phys Other. If you have any questions after today's visit, please call [phone].

## 2018-01-08 NOTE — PATIENT INSTRUCTIONS
Wong: Care Instructions  Your Care Instructions    Burns-even minor ones-can be very painful. A minor burn may heal within several days, while a more serious burn may take weeks or even months to heal completely. You may notice that the burned area feels tight and hard while it is healing. It is important to continue to move the area as the burn heals to prevent loss of motion or loss of function in the area. When your skin is damaged by a burn, you have a greater risk of infection. Keep the wound clean and change the bandages regularly to prevent infection and help the burn heal.  Burns can leave permanent scars. Taking good care of the burn as it heals may help prevent bad scars. The doctor has checked you carefully, but problems can develop later. If you notice any problems or new symptoms, get medical treatment right away. Follow-up care is a key part of your treatment and safety. Be sure to make and go to all appointments, and call your doctor if you are having problems. It's also a good idea to know your test results and keep a list of the medicines you take. How can you care for yourself at home? · If your doctor told you how to care for your burn, follow your doctor's instructions. If you did not get instructions, follow this general advice:  ¨ Wash the burn with clean water 2 times a day. Don't use hydrogen peroxide or alcohol, which can slow healing. ¨ Gently pat the burn dry after you wash it. ¨ You may cover the burn with a thin layer of petroleum jelly, such as Vaseline, and a nonstick bandage. ¨ Apply more petroleum jelly and replace the bandage as needed. · Protect your burn while it is healing. Cover your burn if you are going out in the cold or the sun. ¨ Wear long sleeves if the burn is on your hands or arms. ¨ Wear a hat if the burn is on your face. ¨ Wear socks and shoes if the burn is on your feet. · Do not break blisters open. This increases the chance of infection.  If a blister breaks open by itself, blot up the liquid, and leave the skin that covered the blister. This helps protect the new skin. · If your doctor prescribed antibiotics, take them as directed. Do not stop taking them just because you feel better. You need to take the full course of antibiotics. For pain and itching  · Take pain medicines exactly as directed. ¨ If the doctor gave you a prescription medicine for pain, take it as prescribed. ¨ If you are not taking a prescription pain medicine, ask your doctor if you can take an over-the-counter medicine. · If the burn itches, try not to scratch it. Try an over-the-counter antihistamine such as diphenhydramine (Benadryl) or loratadine (Claritin). Read and follow all instructions on the label. When should you call for help? Call your doctor now or seek immediate medical care if:  ? · Your pain gets worse. ? · You have symptoms of infection, such as:  ¨ Increased pain, swelling, warmth, or redness near the burn. ¨ Red streaks leading from the burn. ¨ Pus draining from the burn. ¨ A fever. ? Watch closely for changes in your health, and be sure to contact your doctor if:  ? · You do not get better as expected. Where can you learn more? Go to http://devora-nereida.info/. Enter J182 in the search box to learn more about \"Burns: Care Instructions. \"  Current as of: March 20, 2017  Content Version: 11.4  © 2125-4783 [employer]row. Care instructions adapted under license by [employer] (which disclaims liability or warranty for this information). If you have questions about a medical condition or this instruction, always ask your healthcare professional. [place] [address] any warranty or liability for your use of this information.

## 2018-01-08 NOTE — PROGRESS NOTES
Terra Luna is a 61 y.o. female    Issues discussed today include:    Chief Complaint   Patient presents with   [facility] Follow Up       1) UTI, ER follow up: Went to ED on 12/15/17 for  sxs, was given flagyl for that. Didn't fill diflucan bc no yeast sxs developed. Feels better now, no abd pain, dysuria, fever, nausea. 2) Lower lip lesion:  Started ~ 1 week ago. Not sure if fever blister or burn. Denies febrile illness. Was eating microwaved rice krispie treat and dripped some marshmellow on lip. Then the spot blistered up and drained. Was very painful and more swollen, a but better now. Has been applying docosanol, not sure if helping. Has PCN and sulfa allergy. 3) Dry mouth:  Started 2 weeks ago. Has urinary freq only after eating salt. On HCTZ. Not on opiates. Wonders if due to recent abx. Data reviewed or ordered today:       Other problems include:  Patient Active Problem List   Diagnosis Code    Hypertension I10    GERD (gastroesophageal reflux disease) K21.9    Allergic rhinitis, cause unspecified J30.9    Hypercholesterolemia E78.00       Medications:  Current Outpatient Prescriptions   Medication Sig Dispense Refill    mupirocin (BACTROBAN) 2 % ointment Apply  to affected area three (3) times daily. 22 g 0    hydroCHLOROthiazide (HYDRODIURIL) 25 mg tablet Take 0.5 Tabs by mouth daily. For blood pressure 90 Tab 1    lovastatin (MEVACOR) 40 mg tablet Take 1 Tab by mouth nightly. For cholesterol 90 Tab 3    ACETAMINOPHEN (TYLENOL 8 HOUR PO) Take  by mouth.  aluminum & magnesium hydroxide-simethicone (MYLANTA MAXIMUM STRENGTH) 400-400-40 mg/5 mL suspension Take 10 mL by mouth every six (6) hours as needed for Indigestion. 400 mL 3    FERROUS FUMARATE (IRON PO) Take  by mouth.  aspirin 81 mg tablet Take 81 mg by mouth.  multivitamin, stress formula (STRESS TAB) tablet Take 1 Tab by mouth daily.       cholecalciferol, vitamin d3, (VITAMIN D) 1,000 unit tablet Take 1,000 Units by mouth daily.  loratadine (CLARITIN) 10 mg tablet Take 10 mg by mouth daily as needed. Allergies: Allergies   Allergen Reactions    Latex Rash    Strawberry Hives and Rash    Bactrim [Sulfamethoprim Ds] Hives    Other Plant, Animal, Environmental Hives     Wool      Penicillin G Hives       LMP:  No LMP recorded. Patient has had a hysterectomy. Social History     Social History    Marital status: LEGALLY      Spouse name: N/A    Number of children: N/A    Years of education: N/A     Occupational History    Not on file. Social History Main Topics    Smoking status: Never Smoker    Smokeless tobacco: Never Used    Alcohol use No    Drug use: No    Sexual activity: Yes     Partners: Male     Birth control/ protection: Surgical     Other Topics Concern    Not on file     Social History Narrative       Family History   Problem Relation Age of Onset    Hypertension Mother     Elevated Lipids Mother     Other Mother      arthritis    Glaucoma Mother     Hypertension Father     Elevated Lipids Father     Other Maternal Grandmother      arthritis    Other Daughter      dialysis-after pregnancy, was on it for 18 yrs then passed away         Physical Exam   Visit Vitals    /85 (BP 1 Location: Right arm, BP Patient Position: Sitting)    Pulse 60    Temp 97.6 °F (36.4 °C) (Oral)    Wt 182 lb (82.6 kg)    BMI 30.76 kg/m2      BP Readings from Last 3 Encounters:   01/08/18 140/85   12/15/17 (!) 148/97   10/02/17 133/86     Constitutional: Appears well,  No acute distress, Vitals noted  Psychiatric:  Affect normal, Alert and Oriented to person/place/time  Eyes:  Conjunctiva clear, no drainage  ENT:  External ears and nose normal, Mucous membranes moist. OP or and oral mucosa without erythema, edema or exudate. Bilateral nares without active drainage, edema or polyps.   Lip: R lower lip with crusted, raised spot that is tender, no surrounding edema or erythema. Neck:  General inspection normal. Supple. Heart:  Normal HR, Normal S1 and S2,  Regular rhythm. No murmurs, rubs or gallops. Lungs:  No respiratory distress  Skin:  Warm to palpation, without rashes      Assessment/Plan:      ICD-10-CM ICD-9-CM    1. Lip lesion K13.0 528.5    2. Elevated serum creatinine F62.60 640.41 METABOLIC PANEL, BASIC   3. Dry mouth R68.2 527.7        Lip lesion most likely burn, will give bactroban bid x 5-7 days. Can stop antiviral now. Last labs 10/2/17 with elevated Cr. On HCTZ. Will check BMP today and consider change to ACEi if persistent, may help with dry mouth and reported muscle cramps  Nurse to give EWL information, not UTD on mammo and due for pap in 8/2017  Declined flu vaccine, thinks she's allergic to component    Follow-up Disposition:  Return in about 6 months (around 7/8/2018) for HTN follow up.         Skye Nagy MD  [address] [place] [address], Myesha Lees

## 2018-01-08 NOTE — PROGRESS NOTES
Printed AVS, provided to pt and reviewed. Pt indicated understanding and had no questions. Told pt that rx's have been sent to pharmacy and they should be ready for  in approximately 2 hrs. Pt told to please present GoodRx. com coupon which we provide to your pharmacy to receive discounted price. Provided pt with information and phone # for [facility]. Explained that they will do a financial screening before scheduling appt.  Werner Neri RN

## 2018-01-08 NOTE — LETTER
NOTIFICATION RETURN TO WORK / SCHOOL 
 
1/8/2018 11:53 AM 
 
Ms. Alicia Murillo [address] [facility] [address] [phone] To Whom It May Concern: 
 
Alicia Murillo is currently under the care of RAYMOND Hi. She will return to work/school on: 1/8/17 after her appointment If there are questions or concerns please have the patient contact our office. Sincerely, Myke Shearer MD

## 2020-06-05 ENCOUNTER — HOSPITAL ENCOUNTER (OUTPATIENT)
Dept: MAMMOGRAPHY | Age: 60
Discharge: HOME | End: 2020-06-05
Attending: NURSE PRACTITIONER
Payer: MEDICAID

## 2020-06-05 DIAGNOSIS — Z12.31 VISIT FOR SCREENING MAMMOGRAM: ICD-10-CM

## 2020-06-05 PROCEDURE — 77067 SCR MAMMO BI INCL CAD: CPT

## 2021-06-10 ENCOUNTER — TRANSCRIBE ORDER (OUTPATIENT)
Dept: SCHEDULING | Age: 61
End: 2021-06-10

## 2021-06-10 DIAGNOSIS — Z12.31 VISIT FOR SCREENING MAMMOGRAM: Primary | ICD-10-CM

## 2021-07-21 ENCOUNTER — HOSPITAL ENCOUNTER (OUTPATIENT)
Dept: MAMMOGRAPHY | Age: 61
Discharge: HOME | End: 2021-07-21
Attending: NURSE PRACTITIONER
Payer: MEDICAID

## 2021-07-21 DIAGNOSIS — Z12.31 VISIT FOR SCREENING MAMMOGRAM: ICD-10-CM

## 2021-07-21 PROCEDURE — 77067 SCR MAMMO BI INCL CAD: CPT

## 2022-07-07 ENCOUNTER — TRANSCRIBE ORDER (OUTPATIENT)
Dept: SCHEDULING | Age: 62
End: 2022-07-07

## 2022-07-07 DIAGNOSIS — Z12.31 SCREENING MAMMOGRAM FOR HIGH-RISK PATIENT: Primary | ICD-10-CM

## 2022-07-22 ENCOUNTER — HOSPITAL ENCOUNTER (OUTPATIENT)
Dept: MAMMOGRAPHY | Age: 62
Discharge: HOME | End: 2022-07-22
Attending: NURSE PRACTITIONER
Payer: MEDICAID

## 2022-07-22 DIAGNOSIS — Z12.31 SCREENING MAMMOGRAM FOR HIGH-RISK PATIENT: ICD-10-CM

## 2022-07-22 PROCEDURE — 77067 SCR MAMMO BI INCL CAD: CPT

## 2023-07-26 ENCOUNTER — TRANSCRIBE ORDERS (OUTPATIENT)
Facility: HOSPITAL | Age: 63
End: 2023-07-26

## 2023-07-26 DIAGNOSIS — Z12.31 VISIT FOR SCREENING MAMMOGRAM: Primary | ICD-10-CM

## 2023-08-15 ENCOUNTER — HOSPITAL ENCOUNTER (OUTPATIENT)
Age: 63
Discharge: HOME | End: 2023-08-18
Payer: MEDICAID

## 2023-08-15 VITALS — HEIGHT: 64 IN | BODY MASS INDEX: 31.41 KG/M2 | WEIGHT: 184 LBS

## 2023-08-15 DIAGNOSIS — Z12.31 VISIT FOR SCREENING MAMMOGRAM: ICD-10-CM

## 2023-08-15 PROCEDURE — 77067 SCR MAMMO BI INCL CAD: CPT

## 2024-08-09 ENCOUNTER — TRANSCRIBE ORDERS (OUTPATIENT)
Facility: HOSPITAL | Age: 64
End: 2024-08-09

## 2024-08-09 DIAGNOSIS — Z12.31 SCREENING MAMMOGRAM FOR HIGH-RISK PATIENT: Primary | ICD-10-CM

## 2024-08-16 ENCOUNTER — HOSPITAL ENCOUNTER (OUTPATIENT)
Age: 64
Discharge: HOME | End: 2024-08-16
Payer: COMMERCIAL

## 2024-08-16 VITALS — WEIGHT: 174 LBS | BODY MASS INDEX: 29.71 KG/M2 | HEIGHT: 64 IN

## 2024-08-16 DIAGNOSIS — Z12.31 SCREENING MAMMOGRAM FOR HIGH-RISK PATIENT: ICD-10-CM

## 2024-08-16 PROCEDURE — 77067 SCR MAMMO BI INCL CAD: CPT
